# Patient Record
Sex: MALE | Race: BLACK OR AFRICAN AMERICAN
[De-identification: names, ages, dates, MRNs, and addresses within clinical notes are randomized per-mention and may not be internally consistent; named-entity substitution may affect disease eponyms.]

---

## 2018-12-20 ENCOUNTER — HOSPITAL ENCOUNTER (INPATIENT)
Dept: HOSPITAL 74 - YASAS | Age: 53
LOS: 6 days | Discharge: HOME | DRG: 773 | End: 2018-12-26
Attending: INTERNAL MEDICINE | Admitting: INTERNAL MEDICINE
Payer: COMMERCIAL

## 2018-12-20 VITALS — BODY MASS INDEX: 33.3 KG/M2

## 2018-12-20 DIAGNOSIS — Z59.0: ICD-10-CM

## 2018-12-20 DIAGNOSIS — M54.5: ICD-10-CM

## 2018-12-20 DIAGNOSIS — F11.10: ICD-10-CM

## 2018-12-20 DIAGNOSIS — F43.10: ICD-10-CM

## 2018-12-20 DIAGNOSIS — F10.230: Primary | ICD-10-CM

## 2018-12-20 DIAGNOSIS — R76.11: ICD-10-CM

## 2018-12-20 DIAGNOSIS — F31.9: ICD-10-CM

## 2018-12-20 DIAGNOSIS — I10: ICD-10-CM

## 2018-12-20 DIAGNOSIS — R01.1: ICD-10-CM

## 2018-12-20 DIAGNOSIS — F14.20: ICD-10-CM

## 2018-12-20 DIAGNOSIS — F17.210: ICD-10-CM

## 2018-12-20 PROCEDURE — HZ2ZZZZ DETOXIFICATION SERVICES FOR SUBSTANCE ABUSE TREATMENT: ICD-10-PCS | Performed by: INTERNAL MEDICINE

## 2018-12-20 RX ADMIN — Medication SCH MG: at 22:43

## 2018-12-20 SDOH — ECONOMIC STABILITY - HOUSING INSECURITY: HOMELESSNESS: Z59.0

## 2018-12-20 NOTE — HP
CIWA Score


Nausea/Vomitin-Mild Nausea/No Vomiting


Muscle Tremors: 2


Anxiety: 4-Mod. Anxious/Guarded


Agitation: 0-Normal Activity


Paroxysmal Sweats: 2


Orientation: 2-Disoriented Date<2 days


Tacttile Disturbances: 0-None


Auditory Disturbances: 1-Very Mild


Visual Disturbances: 2-Mild Sensitivity


Headache: 1-Very Mild


CIWA-Ar Total Score: 15





- Admission Criteria


[facility] Guidelines: Admission for Medically Managed Detox: 


Requires at least one of the followin. CIWA greater than 12


2. Seizures within the past 24 hours


3. Delirium tremens within the past 24 hours


4. Hallucinations within the past 24 hours


5. Acute intervention needed for co  occurring medical disorder


6. Acute intervention needed for co  occurring psychiatric disorder


7. Severe withdrawal that cannot be handled at a lower level of care (continued


    vomiting, continued diarrhea, abnormal vital signs) requiring intravenous


    medication and/or fluids


8. Pregnancy








Admission ROS [facility]





- HPI


Allergies/Adverse Reactions: 


 Allergies











Allergy/AdvReac Type Severity Reaction Status Date / Time


 


No Known Allergies Allergy   Verified 18 12:31











History of Present Illness: 





patient here requesting detox from etoh use , reports 1-2 pints/day , latest 

use 4  am today , current ELIJAH 0.000 , reports  5-day binge , prior  weekly use 

, reports symptoms as above  if not drinking, + tremors , + cocaine withdrawal 

seizures , + blackouts , + falls  denies injuries  to self or others, states he 

does not drive . Detox   x  5  , most recently summer 2018 [facility] , rehab 

and long-term  summer 2017 , sober x  6 months . First age of use  etoh  "as a 

child " , currently reports feeling tired, has  not  slept in 5  days . 

Referred by [facility] ER . 


cocaine : " a lot " , 1-2  oz  via inhalation, denies IVDU , first age of use 

24 


utox + patrice 


tobacco :  1  ppd , does not want nrt 


PMHX : htn 


meds : forgot name of meds 


PSHx : denies


Psych : denies 


SHX : homeless 


Exam Limitations: Clinical Condition





- Ebola screening


Have you traveled outside of the country in the last 21 days: No


Have you had contact with anyone from an Ebola affected area: No


Have you been sick,other than usual withdrawal symptoms: No


Do you have a fever: No





- Review of Systems


Constitutional: See HPI


EENT: reports: No Symptoms Reported


Respiratory: reports: No Symptoms reported


Cardiac: reports: No Symptoms Reported


GI: reports: No Symptoms Reported


: reports: No Symptoms Reported


Musculoskeletal: reports: No Symptoms Reported


Integumentary: reports: No Symptoms Reported


Neuro: reports: No Symptoms reported


Endocrine: reports: No Symptoms Reported


Psychiatric: reports: Anxious, Depressed, Disorientated





Patient History





- Patient Medical History


Hx Asthma: No


Hx Chronic Obstructive Pulmonary Disease (COPD): No


Hx Cardiac Disorders: No


Hx Hypertension: Yes


Hx Seizures: No


Hx Diabetes: No


Hx Gastrointestinal Disorders: No


Hx Genitourinary Disorders: No


Hx Sexually Transmitted Disorders: No


Hx Renal Disease (ESRD): No


Hx Depression: No


Hx Suicide Attempt: No


Hx Schizophrenia: No





- Patient Surgical History


Past Surgical History: No


Hx Neurologic Surgery: No


Hx Cataract Extraction: No


Hx Cardiac Surgery: No


Hx Lung Surgery: No


Hx Breast Surgery: No


Hx Breast Biopsy: No


Hx Abdominal Surgery: No


Hx Appendectomy: No


Hx Cholecystectomy: No


Hx Genitourinary Surgery: No


Hx  Section: No


Hx Orthopedic Surgery: No


Anesthesia Reaction: No





- PPD History


Previous Implant?: Yes


Documented Results: Positive w/o proof


Implanted On Prior R Admission?: No





- Smoking Cessation


Smoking history: Current every day smoker


Have you smoked in the past 12 months: Yes


Aproximately how many cigarettes per day: 10


Hx Chewing Tobacco Use: No


Initiated information on smoking cessation: No





- Substances Abused


  ** Heroin


Route: Inhalation


Frequency: 3-6 times per week


Amount used: 1 bags


Age of first use: 38


Date of Last Use: 18





  ** Crack


Route: Smoking


Frequency: Daily


Amount used: $20


Age of first use: 24


Date of Last Use: 18





  ** Alcohol-cruz/beer


Route: Oral


Frequency: Daily


Amount used: 2 pts./1-2 6 pks.


Age of first use: 17


Date of Last Use: 18





Family Disease History





- Family Disease History


Family History: Denies





Admission Physical Exam [facility]





- Vital Signs


Vital Signs: 


 Vital Signs - 24 hr











  18





  12:21


 


Temperature 97.3 F L


 


Pulse Rate 71


 


Respiratory 20





Rate 


 


Blood Pressure 137/67














- Physical


General Appearance: Yes: Severe Distress, Intoxicated, Irritable, Sweating, 

Anxious


HEENTM: Yes: EOMI, Hearing grossly Normal, Normocephalic, Normal Voice


Respiratory: Yes: Chest Non-Tender, Lungs Clear, Normal Breath Sounds


Neck: Yes: No masses,lesions,Nodules, Trachea in good position


Breast: Yes: Breast Exam Deferred


Cardiology: Yes: Regular Rhythm, Regular Rate, S1, S2, Murmur, Systolic Murmur (

3/6 EDINSON  left 2nd IC space)


Abdominal: Yes: Normal Bowel Sounds, Soft


Genitourinary: Yes: Within Normal Limits


Back: Yes: Normal Inspection


Musculoskeletal: Yes: full range of Motion, Other (staggering gait)


Extremities: Yes: Normal Capillary Refill, Normal Inspection


Neurological: Yes: Motor Strength 5/5, Normal Mood/Affect


Integumentary: Yes: Normal Color, Dry, Warm





- Diagnostic


(1) Alcohol dependence


Current Visit: Yes   Status: Acute   


Qualifiers: 


   Substance use status: in withdrawal 





(2) Cocaine dependence


Current Visit: Yes   Status: Chronic   


Qualifiers: 


   Substance use status: with other cocaine-induced disorder   Qualified Code(s)

: F14.288 - Cocaine dependence with other cocaine-induced disorder; F14.28 - 

Cocaine dependence with other cocaine-induced disorder   





(3) Nicotine dependence


Current Visit: Yes   Status: Chronic   


Qualifiers: 


   Nicotine product type: cigarettes 





BHS Breath Alcohol Content


Breath Alcohol Content: 0





Urine Drug Screen





- Results


Drug Screen Negative: No


Urine Drug Screen Results: PATRICE-Cocaine

## 2018-12-21 LAB
ALBUMIN SERPL-MCNC: 3.6 G/DL (ref 3.4–5)
ALP SERPL-CCNC: 75 U/L (ref 45–117)
ALT SERPL-CCNC: 46 U/L (ref 13–61)
ANION GAP SERPL CALC-SCNC: 7 MMOL/L (ref 8–16)
AST SERPL-CCNC: 51 U/L (ref 15–37)
BILIRUB SERPL-MCNC: 0.3 MG/DL (ref 0.2–1)
BUN SERPL-MCNC: 18 MG/DL (ref 7–18)
CALCIUM SERPL-MCNC: 8.9 MG/DL (ref 8.5–10.1)
CHLORIDE SERPL-SCNC: 108 MMOL/L (ref 98–107)
CO2 SERPL-SCNC: 27 MMOL/L (ref 21–32)
CREAT SERPL-MCNC: 1.2 MG/DL (ref 0.55–1.3)
DEPRECATED RDW RBC AUTO: 15.2 % (ref 11.9–15.9)
GLUCOSE SERPL-MCNC: 133 MG/DL (ref 74–106)
HCT VFR BLD CALC: 40.2 % (ref 35.4–49)
HGB BLD-MCNC: 12.4 GM/DL (ref 11.7–16.9)
MCH RBC QN AUTO: 24.9 PG (ref 25.7–33.7)
MCHC RBC AUTO-ENTMCNC: 30.9 G/DL (ref 32–35.9)
MCV RBC: 80.4 FL (ref 80–96)
PLATELET # BLD AUTO: 274 K/MM3 (ref 134–434)
PMV BLD: 9.4 FL (ref 7.5–11.1)
POTASSIUM SERPLBLD-SCNC: 4.3 MMOL/L (ref 3.5–5.1)
PROT SERPL-MCNC: 6.9 G/DL (ref 6.4–8.2)
RBC # BLD AUTO: 5 M/MM3 (ref 4–5.6)
SODIUM SERPL-SCNC: 142 MMOL/L (ref 136–145)
WBC # BLD AUTO: 6.4 K/MM3 (ref 4–10)

## 2018-12-21 RX ADMIN — Medication SCH: at 22:43

## 2018-12-21 RX ADMIN — Medication SCH TAB: at 10:32

## 2018-12-21 NOTE — PN
S CIWA





- CIWA Score


Nausea/Vomitin


Muscle Tremors: 2


Anxiety: 2


Agitation: 2


Paroxysmal Sweats: 1-Minimal Palms Moist


Orientation: 0-Oriented


Tacttile Disturbances: 0-None


Auditory Disturbances: 0-None


Visual Disturbances: 0-None


Headache: 0-None Present


CIWA-Ar Total Score: 9





BHS Progress Note (SOAP)


Subjective: 





Pt states that he was admitted for alcohol detox, says that was also using 

heroin- but utox pos only for cocaine. Pt has not had prior visits here. Pt 

states he feels like he is withdrawing from heroin.





O:


 Vital Signs - 24 hr











  18





  15:30 17:50 21:29


 


Temperature 98.5 F 98.2 F 97.3 F L


 


Pulse Rate 69  74


 


Respiratory 18 20 18





Rate   


 


Blood Pressure 154/84  153/86














  18





  00:30 03:21 06:28


 


Temperature   97 F L


 


Pulse Rate   58 L


 


Respiratory 18 18 18





Rate   


 


Blood Pressure   147/81














  18





  09:29


 


Temperature 97.4 F L


 


Pulse Rate 64


 


Respiratory 18





Rate 


 


Blood Pressure 157/99








 Laboratory Tests











  18





  12:20 05:45 05:45


 


WBC   6.4 


 


RBC   5.00 


 


Hgb   12.4 


 


Hct   40.2 


 


MCV   80.4 


 


MCH   24.9 L 


 


MCHC   30.9 L 


 


RDW   15.2 


 


Plt Count   274 


 


MPV   9.4 


 


Sodium    142


 


Potassium    4.3


 


Chloride    108 H


 


Carbon Dioxide    27


 


Anion Gap    7 L


 


BUN    18


 


Creatinine    1.2


 


Creat Clearance w eGFR    > 60


 


Random Glucose    133 H


 


Calcium    8.9


 


Total Bilirubin    0.3


 


AST    51 H


 


ALT    46


 


Alkaline Phosphatase    75


 


Total Protein    6.9


 


Albumin    3.6


 


HIV 1&2 Antibody Screen  Negative  


 


HIV P24 Antigen  Negative  








a/p: continue alcohol detox protocol, will give clonidine and vistaril for Sxic 

treatment

## 2018-12-22 RX ADMIN — Medication SCH: at 22:32

## 2018-12-22 RX ADMIN — Medication SCH: at 11:38

## 2018-12-22 RX ADMIN — TRIMETHOBENZAMIDE HYDROCHLORIDE PRN MG: 100 INJECTION INTRAMUSCULAR at 14:07

## 2018-12-22 RX ADMIN — ONDANSETRON PRN MG: 4 TABLET, ORALLY DISINTEGRATING ORAL at 19:25

## 2018-12-22 RX ADMIN — LIDOCAINE SCH PATCH: 50 PATCH TOPICAL at 12:11

## 2018-12-22 NOTE — PN
S CIWA





- CIWA Score


Nausea/Vomitin


Muscle Tremors: 2


Anxiety: 2


Agitation: 2


Paroxysmal Sweats: 1-Minimal Palms Moist


Orientation: 0-Oriented


Tacttile Disturbances: 1-Very Mild Itch/Numbness


Auditory Disturbances: 1-Very Mild


Visual Disturbances: 0-None


Headache: 2-Mild


CIWA-Ar Total Score: 13





B[facility] Progress Note (SOAP)


Subjective: 





alert,irritable,anxious,interrupted sleep,tremor,pain in lower back,history of 

low back pain,herniated disc


Objective: 





18 11:38


 Vital Signs











Temperature  98.2 F   18 06:21


 


Pulse Rate  64   18 06:21


 


Respiratory Rate  18   18 06:21


 


Blood Pressure  178/93 H  18 06:21


 


O2 Sat by Pulse Oximetry (%)      











18 11:38


 Laboratory Last Values











WBC  6.4 K/mm3 (4.0-10.0)   18  05:45    


 


RBC  5.00 M/mm3 (4.00-5.60)   18  05:45    


 


Hgb  12.4 GM/dL (11.7-16.9)   18  05:45    


 


Hct  40.2 % (35.4-49)   18  05:45    


 


MCV  80.4 fl (80-96)   18  05:45    


 


MCH  24.9 pg (25.7-33.7)  L  18  05:45    


 


MCHC  30.9 g/dl (32.0-35.9)  L  18  05:45    


 


RDW  15.2 % (11.9-15.9)   18  05:45    


 


Plt Count  274 K/MM3 (134-434)   18  05:45    


 


MPV  9.4 fl (7.5-11.1)   18  05:45    


 


Sodium  142 mmol/L (136-145)   18  05:45    


 


Potassium  4.3 mmol/L (3.5-5.1)   18  05:45    


 


Chloride  108 mmol/L ()  H  18  05:45    


 


Carbon Dioxide  27 mmol/L (21-32)   18  05:45    


 


Anion Gap  7 MMOL/L (8-16)  L  18  05:45    


 


BUN  18 mg/dL (7-18)   18  05:45    


 


Creatinine  1.2 mg/dL (0.55-1.3)   18  05:45    


 


Creat Clearance w eGFR  > 60  (>60)   18  05:45    


 


Random Glucose  133 mg/dL ()  H  18  05:45    


 


Calcium  8.9 mg/dL (8.5-10.1)   18  05:45    


 


Total Bilirubin  0.3 mg/dL (0.2-1)   18  05:45    


 


AST  51 U/L (15-37)  H  18  05:45    


 


ALT  46 U/L (13-61)   18  05:45    


 


Alkaline Phosphatase  75 U/L ()   18  05:45    


 


Total Protein  6.9 g/dl (6.4-8.2)   18  05:45    


 


Albumin  3.6 g/dl (3.4-5.0)   18  05:45    


 


RPR Titer  Nonreactive  (NONREACTIVE)   18  05:45    


 


HIV 1&2 Antibody Screen  Negative   18  12:20    


 


HIV P24 Antigen  Negative   18  12:20    











Assessment: 





18 11:39


withdrawal symptom 


Plan: 





continue detox,fasting glucose in am

## 2018-12-23 RX ADMIN — TRIMETHOBENZAMIDE HYDROCHLORIDE PRN MG: 100 INJECTION INTRAMUSCULAR at 13:16

## 2018-12-23 RX ADMIN — Medication SCH: at 11:45

## 2018-12-23 RX ADMIN — Medication SCH: at 22:39

## 2018-12-23 RX ADMIN — ONDANSETRON PRN MG: 4 TABLET, ORALLY DISINTEGRATING ORAL at 07:52

## 2018-12-23 RX ADMIN — LIDOCAINE SCH: 50 PATCH TOPICAL at 11:45

## 2018-12-23 NOTE — PN
[facility] Progress Note (SOAP)


Subjective: 





Vomting, chills, tremor, sweating, interrupted sleep


Objective: 





12/23/18 15:51


 Last Vital Signs











Temp Pulse Resp BP Pulse Ox


 


 97.0 F L  71   16   142/95    


 


 12/23/18 14:36  12/23/18 14:36  12/23/18 14:36  12/23/18 14:36   








 Laboratory Tests











  12/20/18 12/21/18 12/21/18





  12:20 05:45 05:45


 


WBC   6.4 


 


RBC   5.00 


 


Hgb   12.4 


 


Hct   40.2 


 


MCV   80.4 


 


MCH   24.9 L 


 


MCHC   30.9 L 


 


RDW   15.2 


 


Plt Count   274 


 


MPV   9.4 


 


Sodium    142


 


Potassium    4.3


 


Chloride    108 H


 


Carbon Dioxide    27


 


Anion Gap    7 L


 


BUN    18


 


Creatinine    1.2


 


Creat Clearance w eGFR    > 60


 


Random Glucose    133 H


 


Fasting Glucose   


 


Calcium    8.9


 


Total Bilirubin    0.3


 


AST    51 H


 


ALT    46


 


Alkaline Phosphatase    75


 


Total Protein    6.9


 


Albumin    3.6


 


RPR Titer   


 


HIV 1&2 Antibody Screen  Negative  


 


HIV P24 Antigen  Negative  














  12/21/18 12/23/18





  05:45 07:30


 


WBC  


 


RBC  


 


Hgb  


 


Hct  


 


MCV  


 


MCH  


 


MCHC  


 


RDW  


 


Plt Count  


 


MPV  


 


Sodium  


 


Potassium  


 


Chloride  


 


Carbon Dioxide  


 


Anion Gap  


 


BUN  


 


Creatinine  


 


Creat Clearance w eGFR  


 


Random Glucose  


 


Fasting Glucose   121 H


 


Calcium  


 


Total Bilirubin  


 


AST  


 


ALT  


 


Alkaline Phosphatase  


 


Total Protein  


 


Albumin  


 


RPR Titer  Nonreactive 


 


HIV 1&2 Antibody Screen  


 


HIV P24 Antigen  








Labs reviewed


Assessment: 





12/23/18 15:51


Withdrawal symptoms


Plan: 





Continue detox


Encouraged PO water intake

## 2018-12-24 LAB
ANION GAP SERPL CALC-SCNC: 6 MMOL/L (ref 8–16)
BUN SERPL-MCNC: 17 MG/DL (ref 7–18)
CALCIUM SERPL-MCNC: 9.2 MG/DL (ref 8.5–10.1)
CHLORIDE SERPL-SCNC: 102 MMOL/L (ref 98–107)
CO2 SERPL-SCNC: 29 MMOL/L (ref 21–32)
CREAT SERPL-MCNC: 1.2 MG/DL (ref 0.55–1.3)
GLUCOSE SERPL-MCNC: 103 MG/DL (ref 74–106)
POTASSIUM SERPLBLD-SCNC: 4.4 MMOL/L (ref 3.5–5.1)
SODIUM SERPL-SCNC: 137 MMOL/L (ref 136–145)

## 2018-12-24 RX ADMIN — LIDOCAINE SCH: 50 PATCH TOPICAL at 11:06

## 2018-12-24 RX ADMIN — Medication SCH TAB: at 11:06

## 2018-12-24 RX ADMIN — Medication SCH: at 22:58

## 2018-12-24 RX ADMIN — AMLODIPINE BESYLATE SCH MG: 10 TABLET ORAL at 11:06

## 2018-12-24 RX ADMIN — TRIMETHOBENZAMIDE HYDROCHLORIDE PRN MG: 100 INJECTION INTRAMUSCULAR at 12:11

## 2018-12-24 NOTE — PN
[facility] Progress Note


Note: 





s 


c/o vomit x yesterday


L flank/lower back pain





o


Noted wretching


uncomfortable


 Vital Signs











Temperature  97.8 F   12/24/18 13:10


 


Pulse Rate  78   12/24/18 13:10


 


Respiratory Rate  18   12/24/18 13:10


 


Blood Pressure  168/99   12/24/18 13:10


 


O2 Sat by Pulse Oximetry (%)      











A


Withdrawal sx





P


continue Tigan IM for vomiting


Continue detox


Attempt po challenge as soon as tolerable


Reasses 


Postpone d/c till withdrawal sx subside

## 2018-12-25 RX ADMIN — AMLODIPINE BESYLATE SCH: 10 TABLET ORAL at 11:55

## 2018-12-25 RX ADMIN — LIDOCAINE SCH: 50 PATCH TOPICAL at 11:55

## 2018-12-25 RX ADMIN — Medication SCH: at 11:55

## 2018-12-25 RX ADMIN — Medication SCH MG: at 21:50

## 2018-12-25 RX ADMIN — Medication SCH: at 21:51

## 2018-12-25 NOTE — PN
BHS Progress Note (SOAP)


Subjective: 





Chills, interrupted sleep


Objective: 





12/25/18 14:27


 Last Vital Signs











Temp Pulse Resp BP Pulse Ox


 


 98.6 F   87   18   128/79    


 


 12/25/18 14:06  12/25/18 14:06  12/25/18 14:06  12/25/18 14:06   








 Laboratory Tests











  12/20/18 12/21/18 12/21/18





  12:20 05:45 05:45


 


WBC   6.4 


 


RBC   5.00 


 


Hgb   12.4 


 


Hct   40.2 


 


MCV   80.4 


 


MCH   24.9 L 


 


MCHC   30.9 L 


 


RDW   15.2 


 


Plt Count   274 


 


MPV   9.4 


 


Sodium    142


 


Potassium    4.3


 


Chloride    108 H


 


Carbon Dioxide    27


 


Anion Gap    7 L


 


BUN    18


 


Creatinine    1.2


 


Creat Clearance w eGFR    > 60


 


Random Glucose    133 H


 


Fasting Glucose   


 


Calcium    8.9


 


Total Bilirubin    0.3


 


AST    51 H


 


ALT    46


 


Alkaline Phosphatase    75


 


Total Protein    6.9


 


Albumin    3.6


 


RPR Titer   


 


HIV 1&2 Antibody Screen  Negative  


 


HIV P24 Antigen  Negative  














  12/21/18 12/23/18 12/24/18





  05:45 07:30 07:45


 


WBC   


 


RBC   


 


Hgb   


 


Hct   


 


MCV   


 


MCH   


 


MCHC   


 


RDW   


 


Plt Count   


 


MPV   


 


Sodium    137


 


Potassium    4.4


 


Chloride    102


 


Carbon Dioxide    29


 


Anion Gap    6 L


 


BUN    17


 


Creatinine    1.2


 


Creat Clearance w eGFR    > 60


 


Random Glucose    103


 


Fasting Glucose   121 H 


 


Calcium    9.2


 


Total Bilirubin   


 


AST   


 


ALT   


 


Alkaline Phosphatase   


 


Total Protein   


 


Albumin   


 


RPR Titer  Nonreactive  


 


HIV 1&2 Antibody Screen   


 


HIV P24 Antigen   








Labs reviewed


Assessment: 





12/25/18 14:28


Withdrawal symptoms


Plan: 





Continue detox


Encouraged PO water intake

## 2018-12-26 VITALS — HEART RATE: 71 BPM | TEMPERATURE: 97 F | DIASTOLIC BLOOD PRESSURE: 67 MMHG | SYSTOLIC BLOOD PRESSURE: 99 MMHG

## 2018-12-26 RX ADMIN — AMLODIPINE BESYLATE SCH: 10 TABLET ORAL at 11:13

## 2018-12-26 RX ADMIN — LIDOCAINE SCH: 50 PATCH TOPICAL at 11:13

## 2018-12-26 RX ADMIN — Medication SCH TAB: at 11:12

## 2018-12-26 NOTE — DS
[facility] Detox Discharge Summary


Admission Date: 


12/20/18





Discharge Date: 12/26/18





- History


Present History: Alcohol Dependence, Cocaine Dependence


Additional Comments: 





PATIENT REFERRED TO [facility] ([place], [place] FOR AFTERCARE. 

PATIENT WAS DISCHARGED FROM DETOX UNIT IN STABLE MEDICAL CONDITION.


Pertinent Past History: 





HTN, Nicotine Dependence.





- Physical Exam Results


Vital Signs: 


 Vital Signs











Temperature  97 F L  12/26/18 06:32


 


Pulse Rate  71   12/26/18 06:32


 


Respiratory Rate  18   12/26/18 06:32


 


Blood Pressure  99/67   12/26/18 06:32


 


O2 Sat by Pulse Oximetry (%)      











Pertinent Admission Physical Exam Findings: 





WITHDRAWAL SYMPTOMS.





 Laboratory Tests











  12/20/18 12/21/18 12/21/18





  12:20 05:45 05:45


 


WBC   6.4 


 


RBC   5.00 


 


Hgb   12.4 


 


Hct   40.2 


 


MCV   80.4 


 


MCH   24.9 L 


 


MCHC   30.9 L 


 


RDW   15.2 


 


Plt Count   274 


 


MPV   9.4 


 


Sodium    142


 


Potassium    4.3


 


Chloride    108 H


 


Carbon Dioxide    27


 


Anion Gap    7 L


 


BUN    18


 


Creatinine    1.2


 


Creat Clearance w eGFR    > 60


 


Random Glucose    133 H


 


Fasting Glucose   


 


Calcium    8.9


 


Total Bilirubin    0.3


 


AST    51 H


 


ALT    46


 


Alkaline Phosphatase    75


 


Total Protein    6.9


 


Albumin    3.6


 


RPR Titer   


 


HIV 1&2 Antibody Screen  Negative  


 


HIV P24 Antigen  Negative  














  12/21/18 12/23/18 12/24/18





  05:45 07:30 07:45


 


WBC   


 


RBC   


 


Hgb   


 


Hct   


 


MCV   


 


MCH   


 


MCHC   


 


RDW   


 


Plt Count   


 


MPV   


 


Sodium    137


 


Potassium    4.4


 


Chloride    102


 


Carbon Dioxide    29


 


Anion Gap    6 L


 


BUN    17


 


Creatinine    1.2


 


Creat Clearance w eGFR    > 60


 


Random Glucose    103


 


Fasting Glucose   121 H 


 


Calcium    9.2


 


Total Bilirubin   


 


AST   


 


ALT   


 


Alkaline Phosphatase   


 


Total Protein   


 


Albumin   


 


RPR Titer  Nonreactive  


 


HIV 1&2 Antibody Screen   


 


HIV P24 Antigen   








LABS NOTED.





- Treatment


Hospital Course: Detox Protocol Followed, Detoxed Safely, Responded well, 

Discharged Condition Good, Rehab Referral Accepted


Patient has Accepted a Rehab Referral to: [facility] ([place], [place]

.





- Medication


Discharge Medications: 


Ambulatory Orders





Unobtainable  12/20/18 











- Diagnosis


(1) Alcohol dependence with uncomplicated withdrawal


Status: Acute   





(2) Cocaine dependence


Status: Chronic   


Qualifiers: 


   Substance use status: with other cocaine-induced disorder   Qualified Code(s)

: F14.288 - Cocaine dependence with other cocaine-induced disorder; F14.28 - 

Cocaine dependence with other cocaine-induced disorder   





(3) Nicotine dependence


Status: Chronic   


Qualifiers: 


   Nicotine product type: cigarettes   Substance use status: uncomplicated   

Qualified Code(s): F17.210 - Nicotine dependence, cigarettes, uncomplicated   





- AMA


Did Patient Leave Against Medical Advice: No